# Patient Record
Sex: MALE | Race: WHITE | NOT HISPANIC OR LATINO | Employment: FULL TIME | URBAN - METROPOLITAN AREA
[De-identification: names, ages, dates, MRNs, and addresses within clinical notes are randomized per-mention and may not be internally consistent; named-entity substitution may affect disease eponyms.]

---

## 2020-05-15 ENCOUNTER — OFFICE VISIT - HEALTHEAST (OUTPATIENT)
Dept: FAMILY MEDICINE | Facility: CLINIC | Age: 40
End: 2020-05-15

## 2020-05-15 ENCOUNTER — COMMUNICATION - HEALTHEAST (OUTPATIENT)
Dept: TELEHEALTH | Facility: CLINIC | Age: 40
End: 2020-05-15

## 2020-05-15 DIAGNOSIS — R00.0 TACHYCARDIA: ICD-10-CM

## 2020-05-15 DIAGNOSIS — R03.0 ELEVATED BLOOD PRESSURE READING WITHOUT DIAGNOSIS OF HYPERTENSION: ICD-10-CM

## 2020-05-15 DIAGNOSIS — H90.2 CONDUCTIVE HEARING LOSS, UNILATERAL: ICD-10-CM

## 2020-05-15 DIAGNOSIS — F17.200 TOBACCO USE DISORDER: ICD-10-CM

## 2020-05-15 LAB
ALBUMIN SERPL-MCNC: 4.7 G/DL (ref 3.5–5)
ALP SERPL-CCNC: 56 U/L (ref 45–120)
ALT SERPL W P-5'-P-CCNC: 241 U/L (ref 0–45)
ANION GAP SERPL CALCULATED.3IONS-SCNC: 15 MMOL/L (ref 5–18)
AST SERPL W P-5'-P-CCNC: 155 U/L (ref 0–40)
BILIRUB SERPL-MCNC: 0.7 MG/DL (ref 0–1)
BUN SERPL-MCNC: 5 MG/DL (ref 8–22)
CALCIUM SERPL-MCNC: 9.5 MG/DL (ref 8.5–10.5)
CHLORIDE BLD-SCNC: 101 MMOL/L (ref 98–107)
CO2 SERPL-SCNC: 22 MMOL/L (ref 22–31)
CREAT SERPL-MCNC: 0.73 MG/DL (ref 0.7–1.3)
ERYTHROCYTE [DISTWIDTH] IN BLOOD BY AUTOMATED COUNT: 12.6 % (ref 11–14.5)
GFR SERPL CREATININE-BSD FRML MDRD: >60 ML/MIN/1.73M2
GLUCOSE BLD-MCNC: 98 MG/DL (ref 70–125)
HCT VFR BLD AUTO: 46.1 % (ref 40–54)
HGB BLD-MCNC: 15.7 G/DL (ref 14–18)
MCH RBC QN AUTO: 32.2 PG (ref 27–34)
MCHC RBC AUTO-ENTMCNC: 34 G/DL (ref 32–36)
MCV RBC AUTO: 95 FL (ref 80–100)
PLATELET # BLD AUTO: 157 THOU/UL (ref 140–440)
PMV BLD AUTO: 7.7 FL (ref 7–10)
POTASSIUM BLD-SCNC: 3.9 MMOL/L (ref 3.5–5)
PROT SERPL-MCNC: 7.8 G/DL (ref 6–8)
RBC # BLD AUTO: 4.88 MILL/UL (ref 4.4–6.2)
SODIUM SERPL-SCNC: 138 MMOL/L (ref 136–145)
TSH SERPL DL<=0.005 MIU/L-ACNC: 0.96 UIU/ML (ref 0.3–5)
WBC: 4.6 THOU/UL (ref 4–11)

## 2020-05-18 ENCOUNTER — COMMUNICATION - HEALTHEAST (OUTPATIENT)
Dept: FAMILY MEDICINE | Facility: CLINIC | Age: 40
End: 2020-05-18

## 2020-06-09 ENCOUNTER — OFFICE VISIT - HEALTHEAST (OUTPATIENT)
Dept: FAMILY MEDICINE | Facility: CLINIC | Age: 40
End: 2020-06-09

## 2020-06-09 DIAGNOSIS — I10 ESSENTIAL HYPERTENSION, BENIGN: ICD-10-CM

## 2020-06-09 DIAGNOSIS — F10.10 ALCOHOL ABUSE: ICD-10-CM

## 2020-06-09 DIAGNOSIS — F17.200 TOBACCO USE DISORDER: ICD-10-CM

## 2020-06-09 DIAGNOSIS — R00.0 TACHYCARDIA: ICD-10-CM

## 2020-06-09 DIAGNOSIS — R79.89 ABNORMAL LFTS: ICD-10-CM

## 2020-06-09 LAB
ALBUMIN SERPL-MCNC: 4.8 G/DL (ref 3.5–5)
ALP SERPL-CCNC: 58 U/L (ref 45–120)
ALT SERPL W P-5'-P-CCNC: 316 U/L (ref 0–45)
ANION GAP SERPL CALCULATED.3IONS-SCNC: 12 MMOL/L (ref 5–18)
AST SERPL W P-5'-P-CCNC: 137 U/L (ref 0–40)
BILIRUB SERPL-MCNC: 1.6 MG/DL (ref 0–1)
BUN SERPL-MCNC: 8 MG/DL (ref 8–22)
CALCIUM SERPL-MCNC: 10.5 MG/DL (ref 8.5–10.5)
CHLORIDE BLD-SCNC: 98 MMOL/L (ref 98–107)
CHOLEST SERPL-MCNC: 292 MG/DL
CO2 SERPL-SCNC: 27 MMOL/L (ref 22–31)
CREAT SERPL-MCNC: 0.77 MG/DL (ref 0.7–1.3)
FASTING STATUS PATIENT QL REPORTED: YES
GFR SERPL CREATININE-BSD FRML MDRD: >60 ML/MIN/1.73M2
GGT SERPL-CCNC: 215 U/L (ref 0–50)
GLUCOSE BLD-MCNC: 91 MG/DL (ref 70–125)
HDLC SERPL-MCNC: 97 MG/DL
LDLC SERPL CALC-MCNC: 179 MG/DL
POTASSIUM BLD-SCNC: 4.6 MMOL/L (ref 3.5–5)
PROT SERPL-MCNC: 7.8 G/DL (ref 6–8)
SODIUM SERPL-SCNC: 137 MMOL/L (ref 136–145)
TRIGL SERPL-MCNC: 78 MG/DL

## 2020-06-10 ENCOUNTER — COMMUNICATION - HEALTHEAST (OUTPATIENT)
Dept: FAMILY MEDICINE | Facility: CLINIC | Age: 40
End: 2020-06-10

## 2020-06-10 LAB
HAV IGM SERPL QL IA: NEGATIVE
HBV CORE IGM SERPL QL IA: NEGATIVE
HBV SURFACE AG SERPL QL IA: NEGATIVE
HCV AB SERPL QL IA: NEGATIVE

## 2020-07-10 ENCOUNTER — OFFICE VISIT - HEALTHEAST (OUTPATIENT)
Dept: FAMILY MEDICINE | Facility: CLINIC | Age: 40
End: 2020-07-10

## 2020-07-10 DIAGNOSIS — R00.0 TACHYCARDIA: ICD-10-CM

## 2020-07-10 DIAGNOSIS — R79.89 HIGH SERUM FERRITIN: ICD-10-CM

## 2020-07-10 DIAGNOSIS — I10 ESSENTIAL HYPERTENSION, BENIGN: ICD-10-CM

## 2020-07-10 DIAGNOSIS — F10.10 ALCOHOL ABUSE: ICD-10-CM

## 2020-07-10 DIAGNOSIS — R79.89 ABNORMAL LFTS: ICD-10-CM

## 2020-07-10 DIAGNOSIS — F17.200 TOBACCO USE DISORDER: ICD-10-CM

## 2020-07-10 LAB
ALBUMIN SERPL-MCNC: 4.8 G/DL (ref 3.5–5)
ALP SERPL-CCNC: 52 U/L (ref 45–120)
ALT SERPL W P-5'-P-CCNC: 405 U/L (ref 0–45)
ANION GAP SERPL CALCULATED.3IONS-SCNC: 13 MMOL/L (ref 5–18)
AST SERPL W P-5'-P-CCNC: 250 U/L (ref 0–40)
BILIRUB SERPL-MCNC: 0.9 MG/DL (ref 0–1)
BUN SERPL-MCNC: 7 MG/DL (ref 8–22)
CALCIUM SERPL-MCNC: 10.4 MG/DL (ref 8.5–10.5)
CHLORIDE BLD-SCNC: 97 MMOL/L (ref 98–107)
CO2 SERPL-SCNC: 26 MMOL/L (ref 22–31)
CREAT SERPL-MCNC: 0.78 MG/DL (ref 0.7–1.3)
FERRITIN SERPL-MCNC: 875 NG/ML (ref 27–300)
GFR SERPL CREATININE-BSD FRML MDRD: >60 ML/MIN/1.73M2
GLUCOSE BLD-MCNC: 86 MG/DL (ref 70–125)
POTASSIUM BLD-SCNC: 4.4 MMOL/L (ref 3.5–5)
PROT SERPL-MCNC: 7.8 G/DL (ref 6–8)
SODIUM SERPL-SCNC: 136 MMOL/L (ref 136–145)
TSH SERPL DL<=0.005 MIU/L-ACNC: 1.05 UIU/ML (ref 0.3–5)

## 2020-07-12 ENCOUNTER — AMBULATORY - HEALTHEAST (OUTPATIENT)
Dept: FAMILY MEDICINE | Facility: CLINIC | Age: 40
End: 2020-07-12

## 2020-07-12 DIAGNOSIS — R79.89 ABNORMAL LFTS: ICD-10-CM

## 2020-07-12 DIAGNOSIS — R79.89 HIGH SERUM FERRITIN: ICD-10-CM

## 2020-07-13 LAB
IRON SATN MFR SERPL: 40 % (ref 20–50)
IRON SERPL-MCNC: 120 UG/DL (ref 42–175)
TIBC SERPL-MCNC: 301 UG/DL (ref 313–563)
TRANSFERRIN SERPL-MCNC: 241 MG/DL (ref 212–360)

## 2020-08-03 ENCOUNTER — COMMUNICATION - HEALTHEAST (OUTPATIENT)
Dept: FAMILY MEDICINE | Facility: CLINIC | Age: 40
End: 2020-08-03

## 2020-08-03 DIAGNOSIS — I10 ESSENTIAL HYPERTENSION, BENIGN: ICD-10-CM

## 2021-01-21 ENCOUNTER — OFFICE VISIT - HEALTHEAST (OUTPATIENT)
Dept: FAMILY MEDICINE | Facility: CLINIC | Age: 41
End: 2021-01-21

## 2021-01-21 DIAGNOSIS — R79.89 HIGH SERUM FERRITIN: ICD-10-CM

## 2021-01-21 DIAGNOSIS — R79.89 ABNORMAL LFTS: ICD-10-CM

## 2021-01-21 DIAGNOSIS — I10 ESSENTIAL HYPERTENSION, BENIGN: ICD-10-CM

## 2021-01-21 DIAGNOSIS — Z00.00 ROUTINE GENERAL MEDICAL EXAMINATION AT A HEALTH CARE FACILITY: ICD-10-CM

## 2021-01-21 DIAGNOSIS — F10.10 ALCOHOL ABUSE: ICD-10-CM

## 2021-01-21 LAB
ALBUMIN SERPL-MCNC: 4.9 G/DL (ref 3.5–5)
ALP SERPL-CCNC: 47 U/L (ref 45–120)
ALT SERPL W P-5'-P-CCNC: 197 U/L (ref 0–45)
ANION GAP SERPL CALCULATED.3IONS-SCNC: 13 MMOL/L (ref 5–18)
AST SERPL W P-5'-P-CCNC: 186 U/L (ref 0–40)
BILIRUB SERPL-MCNC: 0.8 MG/DL (ref 0–1)
BUN SERPL-MCNC: 8 MG/DL (ref 8–22)
CALCIUM SERPL-MCNC: 10.3 MG/DL (ref 8.5–10.5)
CHLORIDE BLD-SCNC: 98 MMOL/L (ref 98–107)
CO2 SERPL-SCNC: 27 MMOL/L (ref 22–31)
CREAT SERPL-MCNC: 0.78 MG/DL (ref 0.7–1.3)
ERYTHROCYTE [DISTWIDTH] IN BLOOD BY AUTOMATED COUNT: 12.3 % (ref 11–14.5)
FERRITIN SERPL-MCNC: 896 NG/ML (ref 27–300)
GFR SERPL CREATININE-BSD FRML MDRD: >60 ML/MIN/1.73M2
GGT SERPL-CCNC: 302 U/L (ref 0–50)
GLUCOSE BLD-MCNC: 92 MG/DL (ref 70–125)
HCT VFR BLD AUTO: 42.2 % (ref 40–54)
HGB BLD-MCNC: 14.5 G/DL (ref 14–18)
HIV 1+2 AB+HIV1 P24 AG SERPL QL IA: NEGATIVE
MCH RBC QN AUTO: 33.2 PG (ref 27–34)
MCHC RBC AUTO-ENTMCNC: 34.3 G/DL (ref 32–36)
MCV RBC AUTO: 97 FL (ref 80–100)
PLATELET # BLD AUTO: 163 THOU/UL (ref 140–440)
PMV BLD AUTO: 6.9 FL (ref 7–10)
POTASSIUM BLD-SCNC: 4.3 MMOL/L (ref 3.5–5)
PROT SERPL-MCNC: 8.1 G/DL (ref 6–8)
RBC # BLD AUTO: 4.36 MILL/UL (ref 4.4–6.2)
SODIUM SERPL-SCNC: 138 MMOL/L (ref 136–145)
WBC: 2.6 THOU/UL (ref 4–11)

## 2021-01-21 RX ORDER — LOSARTAN POTASSIUM AND HYDROCHLOROTHIAZIDE 25; 100 MG/1; MG/1
1 TABLET ORAL DAILY
Qty: 90 TABLET | Refills: 1 | Status: SHIPPED | OUTPATIENT
Start: 2021-01-21 | End: 2021-07-24

## 2021-01-21 ASSESSMENT — MIFFLIN-ST. JEOR: SCORE: 1700.3

## 2021-02-22 ENCOUNTER — RECORDS - HEALTHEAST (OUTPATIENT)
Dept: ADMINISTRATIVE | Facility: OTHER | Age: 41
End: 2021-02-22

## 2021-02-22 LAB
ALT SERPL W/O P-5'-P-CCNC: 243 U/L (ref 0–78)
AST SERPL-CCNC: 152 U/L (ref 0–37)

## 2021-02-23 ENCOUNTER — RECORDS - HEALTHEAST (OUTPATIENT)
Dept: ADMINISTRATIVE | Facility: OTHER | Age: 41
End: 2021-02-23

## 2021-02-23 ENCOUNTER — RECORDS - HEALTHEAST (OUTPATIENT)
Dept: SCHEDULING | Facility: CLINIC | Age: 41
End: 2021-02-23

## 2021-02-23 DIAGNOSIS — I10 HYPERTENSION, UNSPECIFIED TYPE: ICD-10-CM

## 2021-02-23 DIAGNOSIS — F10.10 ALCOHOL ABUSE: ICD-10-CM

## 2021-02-23 DIAGNOSIS — R79.89 ELEVATED FERRITIN: ICD-10-CM

## 2021-02-23 DIAGNOSIS — R74.8 ELEVATED LIVER ENZYMES: ICD-10-CM

## 2021-02-24 ENCOUNTER — RECORDS - HEALTHEAST (OUTPATIENT)
Dept: ADMINISTRATIVE | Facility: OTHER | Age: 41
End: 2021-02-24

## 2021-03-03 ENCOUNTER — OFFICE VISIT - HEALTHEAST (OUTPATIENT)
Dept: FAMILY MEDICINE | Facility: CLINIC | Age: 41
End: 2021-03-03

## 2021-03-03 ENCOUNTER — RECORDS - HEALTHEAST (OUTPATIENT)
Dept: HEALTH INFORMATION MANAGEMENT | Facility: CLINIC | Age: 41
End: 2021-03-03

## 2021-03-03 DIAGNOSIS — R00.0 TACHYCARDIA: ICD-10-CM

## 2021-03-03 DIAGNOSIS — R79.89 ABNORMAL LFTS: ICD-10-CM

## 2021-03-03 DIAGNOSIS — F17.200 TOBACCO USE DISORDER: ICD-10-CM

## 2021-03-03 DIAGNOSIS — F10.10 ALCOHOL ABUSE: ICD-10-CM

## 2021-03-03 DIAGNOSIS — I10 ESSENTIAL HYPERTENSION, BENIGN: ICD-10-CM

## 2021-05-07 ENCOUNTER — COMMUNICATION - HEALTHEAST (OUTPATIENT)
Dept: SCHEDULING | Facility: CLINIC | Age: 41
End: 2021-05-07

## 2021-05-10 ENCOUNTER — OFFICE VISIT - HEALTHEAST (OUTPATIENT)
Dept: FAMILY MEDICINE | Facility: CLINIC | Age: 41
End: 2021-05-10

## 2021-05-10 DIAGNOSIS — F10.10 ALCOHOL ABUSE: ICD-10-CM

## 2021-05-10 DIAGNOSIS — F10.931 ALCOHOL WITHDRAWAL SYNDROME, WITH DELIRIUM (H): ICD-10-CM

## 2021-05-10 DIAGNOSIS — F10.931 DELIRIUM TREMENS (H): ICD-10-CM

## 2021-05-10 DIAGNOSIS — R79.89 ABNORMAL LFTS: ICD-10-CM

## 2021-05-10 RX ORDER — NALTREXONE HYDROCHLORIDE 50 MG/1
50 TABLET, FILM COATED ORAL DAILY
Qty: 30 TABLET | Refills: 2 | Status: SHIPPED | OUTPATIENT
Start: 2021-05-10 | End: 2021-09-10

## 2021-05-10 ASSESSMENT — ANXIETY QUESTIONNAIRES
7. FEELING AFRAID AS IF SOMETHING AWFUL MIGHT HAPPEN: NOT AT ALL
IF YOU CHECKED OFF ANY PROBLEMS ON THIS QUESTIONNAIRE, HOW DIFFICULT HAVE THESE PROBLEMS MADE IT FOR YOU TO DO YOUR WORK, TAKE CARE OF THINGS AT HOME, OR GET ALONG WITH OTHER PEOPLE: NOT DIFFICULT AT ALL
4. TROUBLE RELAXING: NOT AT ALL
3. WORRYING TOO MUCH ABOUT DIFFERENT THINGS: NOT AT ALL
6. BECOMING EASILY ANNOYED OR IRRITABLE: SEVERAL DAYS
5. BEING SO RESTLESS THAT IT IS HARD TO SIT STILL: NOT AT ALL
1. FEELING NERVOUS, ANXIOUS, OR ON EDGE: NOT AT ALL
2. NOT BEING ABLE TO STOP OR CONTROL WORRYING: NOT AT ALL
GAD7 TOTAL SCORE: 1

## 2021-05-10 ASSESSMENT — PATIENT HEALTH QUESTIONNAIRE - PHQ9: SUM OF ALL RESPONSES TO PHQ QUESTIONS 1-9: 0

## 2021-05-27 VITALS
HEART RATE: 97 BPM | DIASTOLIC BLOOD PRESSURE: 78 MMHG | WEIGHT: 173.7 LBS | SYSTOLIC BLOOD PRESSURE: 122 MMHG | OXYGEN SATURATION: 98 %

## 2021-05-27 ASSESSMENT — PATIENT HEALTH QUESTIONNAIRE - PHQ9: SUM OF ALL RESPONSES TO PHQ QUESTIONS 1-9: 0

## 2021-05-28 ASSESSMENT — ANXIETY QUESTIONNAIRES: GAD7 TOTAL SCORE: 1

## 2021-06-04 ENCOUNTER — OFFICE VISIT - HEALTHEAST (OUTPATIENT)
Dept: FAMILY MEDICINE | Facility: CLINIC | Age: 41
End: 2021-06-04

## 2021-06-04 VITALS
OXYGEN SATURATION: 97 % | SYSTOLIC BLOOD PRESSURE: 158 MMHG | WEIGHT: 172 LBS | HEART RATE: 107 BPM | DIASTOLIC BLOOD PRESSURE: 104 MMHG

## 2021-06-04 VITALS
HEART RATE: 94 BPM | SYSTOLIC BLOOD PRESSURE: 150 MMHG | OXYGEN SATURATION: 98 % | DIASTOLIC BLOOD PRESSURE: 90 MMHG | WEIGHT: 168 LBS

## 2021-06-04 VITALS
WEIGHT: 166 LBS | DIASTOLIC BLOOD PRESSURE: 78 MMHG | OXYGEN SATURATION: 98 % | HEART RATE: 118 BPM | SYSTOLIC BLOOD PRESSURE: 120 MMHG

## 2021-06-04 DIAGNOSIS — R79.89 ABNORMAL LFTS: ICD-10-CM

## 2021-06-04 DIAGNOSIS — F10.10 ALCOHOL ABUSE: ICD-10-CM

## 2021-06-04 DIAGNOSIS — I10 ESSENTIAL HYPERTENSION, BENIGN: ICD-10-CM

## 2021-06-04 DIAGNOSIS — R00.0 TACHYCARDIA: ICD-10-CM

## 2021-06-04 RX ORDER — METOPROLOL SUCCINATE 100 MG/1
100 TABLET, EXTENDED RELEASE ORAL DAILY
Qty: 90 TABLET | Refills: 3 | Status: SHIPPED | OUTPATIENT
Start: 2021-06-04 | End: 2022-05-06

## 2021-06-05 VITALS
TEMPERATURE: 98.9 F | BODY MASS INDEX: 23.8 KG/M2 | SYSTOLIC BLOOD PRESSURE: 140 MMHG | HEART RATE: 114 BPM | OXYGEN SATURATION: 96 % | HEIGHT: 71 IN | DIASTOLIC BLOOD PRESSURE: 80 MMHG | WEIGHT: 170 LBS

## 2021-06-05 VITALS
OXYGEN SATURATION: 98 % | WEIGHT: 177 LBS | HEART RATE: 123 BPM | SYSTOLIC BLOOD PRESSURE: 134 MMHG | TEMPERATURE: 98.4 F | BODY MASS INDEX: 25.04 KG/M2 | DIASTOLIC BLOOD PRESSURE: 78 MMHG

## 2021-06-08 NOTE — PROGRESS NOTES
Assessment/Plan:    1. Essential hypertension, benign  Hypertension improved control with prior blood pressures 170/110, 158/108, and 158/104 on May 15, 2020.  Started on losartan 50 mg daily.  No side effects.  Blood pressure 146/88 on recheck today.  Switch to losartan hydrochlorothiazide 50/12.5 and use 1 tablet daily.  Blood pressure recheck in 4 weeks.  Check lipid cascade today while fasting.  - losartan-hydrochlorothiazide (HYZAAR) 50-12.5 mg per tablet; Take 1 tablet by mouth daily.  Dispense: 30 tablet; Refill: 2  - Lipid Cascade    2. Tachycardia  Prior tachycardia with pulse 107 now improved and no longer tachycardic.  TSH was normal May 15, 2020.    3. Tobacco use disorder  Continues half pack per day smoking habit.  Chantix was prescribed however patient not ready to pick it up at the pharmacy however wants it available in case he decides to initiate smoking cessation effort through Chantix.  - varenicline (CHANTIX RASHMI) 0.5 mg (11)- 1 mg (42) tablet; Take  0.5mg once daily for 3 days, then 0.5mg twice daily for 3 days, then 1mg twice daily.  Dispense: 60 tablet; Refill: 2    4. Abnormal LFTs  Recent LFTs elevated May 15, 2020 with  and .  Drinks 4-5 beers per day plus more on weekends.  Will repeat LFTs as well as acute hepatitis screen and GGT level.  Consume no more than 2 ounces alcohol daily.  Recheck at follow-up in 4 weeks.  Consider hepatic ultrasound.  - Comprehensive Metabolic Panel  - GGT (Gamma GT)  - Hepatitis Acute Evaluation    5. Alcohol abuse  As above, restrict alcohol to no more than 2 ounces daily due to current consumption of 4-5 beers per day plus more on weekends.        Subjective:    Agus Barragan is seen today for follow-up evaluation.  Was seen May 15, 2020 with concerns for cerumen impaction.  No further concerns with this.  He was noted to have elevated blood pressure.  Started on losartan 50 mg daily due to blood pressures of 170/110 with recheck blood  pressure 158/104.  No side effects from medication.  Not checking blood pressures outside of clinic.  Continues to smoke half pack per day.  Lab evaluation including CBC, TSH and comprehensive metabolic panel were normal except for LFT elevation with  and .  Admits to drinking 4-5 beers per day and more on weekends.  Does not feel that he is an alcoholic.  Comes from a family which this is typical behavior and high functioning.  Wife's family also with similar behavior.  Comprehensive review of systems as above otherwise all negative.     - Areli  1 daughter - Kathy (7)  Tobacco: 1/2 ppd (prior 1 ppd) - prior vape  EtOH: 5-6 beer/drinks on weekend  Mom - mom's side with HTN  Dad -   1 younger bro - cleft palate  Surgeries: none  Hospitalizations: none  Work: [employer] ( for disability benefits)  Hobbies:    No past surgical history on file.     No family history on file.     No past medical history on file.     Social History     Tobacco Use     Smoking status: Current Every Day Smoker     Smokeless tobacco: Never Used   Substance Use Topics     Alcohol use: Not on file     Drug use: Not on file        Current Outpatient Medications   Medication Sig Dispense Refill     losartan (COZAAR) 50 MG tablet Take 1 tablet (50 mg total) by mouth daily. 30 tablet 2     losartan-hydrochlorothiazide (HYZAAR) 50-12.5 mg per tablet Take 1 tablet by mouth daily. 30 tablet 2     varenicline (CHANTIX RASHMI) 0.5 mg (11)- 1 mg (42) tablet Take  0.5mg once daily for 3 days, then 0.5mg twice daily for 3 days, then 1mg twice daily. 60 tablet 2     No current facility-administered medications for this visit.           Objective:    Vitals:    06/09/20 0925   BP: 150/90   Pulse: 94   SpO2: 98%   Weight: 168 lb (76.2 kg)      There is no height or weight on file to calculate BMI.    Alert.  No apparent distress blood pressure 146/88 on recheck.  Chest clear.  Cardiac exam regular.  Pulse without  tachycardia.  Extremities warm and dry.      This note has been dictated using voice recognition software and as a result may contain minor grammatical errors and unintended word substitutions.

## 2021-06-08 NOTE — PROGRESS NOTES
Assessment/Plan:    1. Conductive hearing loss, unilateral  Right-sided hearing loss associated with cerumen impaction.  Ear lavage performed.  Tolerated well.  Resolution of hearing loss.    2. Elevated blood pressure reading without diagnosis of hypertension  Elevated blood pressure new diagnosis hypertension with blood pressure 170/110 rechecked 158/108 and then 158/104.  And initiate losartan 50 mg daily.  Check TSH CBC and comprehensive metabolic panels.  Blood pressure recheck at follow-up in 4 weeks.  - losartan (COZAAR) 50 MG tablet; Take 1 tablet (50 mg total) by mouth daily.  Dispense: 30 tablet; Refill: 2  - Thyroid Stimulating Hormone (TSH)  - Comprehensive Metabolic Panel  - HM2(CBC w/o Differential)    3. Tachycardia  Noted tachycardia pulse 107.  Reassess at follow-up.  Consider beta-blocker if persistent issue.  Ensure no evidence for hyperthyroidism.  - Thyroid Stimulating Hormone (TSH)    4. Tobacco use disorder  Tobacco use disorder.  Currently half pack per day.  Continues attempts at discontinuing.  Declines intervention.        Subjective:    Agus Barragan is seen today for hearing loss.  Right ear.  Uses Q-tips.  Has had blood pressure elevation in the past.  Last seen 7 years ago.  Every time he is at the dentist etc. blood pressure is high and has been told that he needs to be seen.  Past medical social and family history reviewed as noted below.  Denies recent illness.  No headache.  No vision change.  No nasal congestion.  No sore throat.  No cough or shortness of breath.  To smoke currently half pack per day.  Vaping previously and thinks of transitioning to vaping in order to quit.  Patient's mother and maternal family history of hypertension.  Comprehensive review of systems as above otherwise all negative.     - Areli  1 daughter - Kathy  Tobacco: 1/2 ppd (prior 1 ppd) - prior vape  EtOH: 5-6 beer/drinks on weekend  Mom - mom's side with HTN  Dad -   1 younger bro - cleft  palate  Surgeries: none  Hospitalizations: none  Work: [employer] ( for disability benefits)  Hobbies:    History reviewed. No pertinent surgical history.     History reviewed. No pertinent family history.     History reviewed. No pertinent past medical history.     Social History     Tobacco Use     Smoking status: Current Every Day Smoker     Smokeless tobacco: Never Used   Substance Use Topics     Alcohol use: Not on file     Drug use: Not on file        Current Outpatient Medications   Medication Sig Dispense Refill     losartan (COZAAR) 50 MG tablet Take 1 tablet (50 mg total) by mouth daily. 30 tablet 2     No current facility-administered medications for this visit.           Objective:    Vitals:    05/15/20 1516 05/15/20 1523 05/15/20 1647 05/15/20 1648   BP: (!) 170/110 (!) 160/100 (!) 158/108 (!) 158/104   Pulse: (!) 107      SpO2: 97%      Weight: 172 lb (78 kg)         There is no height or weight on file to calculate BMI.    Blood pressure on recheck 158/108 then 158/104.  Chest clear.  Cardiac exam regular.  Ear lavage performed personally with excellent results.  Significant cerumen removal noted with normal-appearing tympanic membrane without perforation.  No middle ear effusion.  Left TM and external canals normal.  Oropharynx normal.  Extremities warm and dry.  Tachycardia noted.      This note has been dictated using voice recognition software and as a result may contain minor grammatical errors and unintended word substitutions.

## 2021-06-10 NOTE — TELEPHONE ENCOUNTER
Medication Request  Medication name:   Losartan 50 mg    Hydrochlorothiazide 12.5 mg    Requested Pharmacy: [address], [phone]  Reason for request:   Patient is requesting scripts to go to a mail order pharmacy.  Please send 90 day supply with 3 refills.  Thank you  When did you use medication last?:    Currently taking.  Patient offered appointment:  No  Okay to leave a detailed message: yes

## 2021-06-14 NOTE — PROGRESS NOTES
Assessment/Plan:     1. Routine general medical examination at a health care facility  Routine healthcare maintenance.  Preventative cares reviewed.  Will need Adacel booster at follow-up.  Routine HIV screen, low risk.    2. Essential hypertension, benign  Hypertension suboptimal control.  Blood pressure 146/84 on recheck.  Increase losartan hydrochlorothiazide 50/12.5 up to 100/25 daily and recheck in the office in 4 weeks.  Contributing factors of excess alcohol consumption reviewed.  Recommendation to abstain however patient appears hesitant.  - Comprehensive Metabolic Panel  - losartan-hydrochlorothiazide (HYZAAR) 100-25 mg per tablet; Take 1 tablet by mouth daily.  Dispense: 90 tablet; Refill: 1    3. Abnormal LFTs  LFT elevation historically with prior GGT level 215,  and .  Ferritin level was elevated at 875 with no personal or family history of hemochromatosis noted.  We will repeat ferritin level and add hemochromatosis gene testing if abnormal.  Patient encouraged to see [facility] in the next 1 to 2 weeks as previously referred.  - GGT (Gamma GT)  - Comprehensive Metabolic Panel    4. High serum ferritin  Prior high ferritin level.  Repeat ferritin level as noted with consideration for hemochromatosis gene testing if continued significant elevation.  - Ferritin  - HM2(CBC w/o Differential)    5. Alcohol abuse  4-5 drinks per day ongoing.  Has not cut back.  Appears hesitant to discontinue.  Increase life stressors described.  Morning tremor typically worse with improvement throughout the day.             Subjective:      Agus Barragan is a 40 y.o. male who presents for an annual exam.  Nonfasting.  Hard-boiled eggs this morning.  Prior cluster elevation noted however protective HDL at 97 with a total cholesterol to HDL ratio around 3.  Patient has had hypertension and continues losartan hydrochlorothiazide 50/12.5 daily.  States that he quit smoking cigarettes in August however now  vaping likely getting more nicotine than before.  Drinks 4-5 drinks per day.  This is not been reduced.  Prior LFT elevation including GGT of 215,  and .  Prior referral to see gastroenterology however never scheduled.  Did get a call within the last 2 weeks and will arrange at earliest convenience.  Does have a tremor often worse in the morning better throughout the day.  Increase life stressors associated with a job change which has led to more stress, not less.  Denies recent illness.  Comprehensive review of systems as above otherwise all negative.       - Areli  1 daughter - Kathy (8)  Tobacco: 1/2 ppd (prior 1 ppd) - prior vape  EtOH: 5-6 beer/drinks daily  Mom - mom's side with HTN  Dad -   1 younger bro - cleft palate  Surgeries: none  Hospitalizations: none  Work: [employer] ( for disability benefits)  Hobbies:      Healthy Habits:   Regular Exercise: Yes  Healthy Diet: Yes  Dental Visits Regularly: Yes  Seat Belt: Yes   Sexually active: Yes  Colonoscopy: N/A  Lipid Profile: Yes  Glucose Screen: Yes      There is no immunization history on file for this patient.  Immunization status: will need Tdap, etc.  Vision Screening:both eyes  Hearing: PASS     Current Outpatient Medications   Medication Sig Dispense Refill     varenicline (CHANTIX RASHMI) 0.5 mg (11)- 1 mg (42) tablet Take  0.5mg once daily for 3 days, then 0.5mg twice daily for 3 days, then 1mg twice daily. 60 tablet 2     losartan-hydrochlorothiazide (HYZAAR) 100-25 mg per tablet Take 1 tablet by mouth daily. 90 tablet 1     No current facility-administered medications for this visit.      History reviewed. No pertinent past medical history.  History reviewed. No pertinent surgical history.  Patient has no known allergies.  History reviewed. No pertinent family history.  Social History     Socioeconomic History     Marital status:      Spouse name: Not on file     Number of children: Not on file     Years  "of education: Not on file     Highest education level: Not on file   Occupational History     Not on file   Social Needs     Financial resource strain: Not on file     Food insecurity     Worry: Not on file     Inability: Not on file     Transportation needs     Medical: Not on file     Non-medical: Not on file   Tobacco Use     Smoking status: Current Every Day Smoker     Smokeless tobacco: Never Used   Substance and Sexual Activity     Alcohol use: Not on file     Drug use: Not on file     Sexual activity: Not on file   Lifestyle     Physical activity     Days per week: Not on file     Minutes per session: Not on file     Stress: Not on file   Relationships     Social connections     Talks on phone: Not on file     Gets together: Not on file     Attends Yarsani service: Not on file     Active member of club or organization: Not on file     Attends meetings of clubs or organizations: Not on file     Relationship status: Not on file     Intimate partner violence     Fear of current or ex partner: Not on file     Emotionally abused: Not on file     Physically abused: Not on file     Forced sexual activity: Not on file   Other Topics Concern     Not on file   Social History Narrative     Not on file       Review of Systems  Comprehensive ROS: as above, otherwise all negative.           Objective:     /80   Pulse (!) 114   Temp 98.9  F (37.2  C)   Ht 5' 10.5\" (1.791 m)   Wt 170 lb (77.1 kg)   SpO2 96%   BMI 24.05 kg/m    Body mass index is 24.05 kg/m .    Physical    General Appearance:    Alert, cooperative, no distress, appears stated age.  Mildly tremulous intermittently.   Head:    Normocephalic, without obvious abnormality, atraumatic   Eyes:    PERRL, conjunctiva/corneas clear, EOM's intact, fundi     benign, both eyes        Ears:    Normal TM's and external ear canals, both ears   Nose:   Nares normal, septum midline, mucosa normal, no drainage    or sinus tenderness   Throat:   Lips, mucosa, and " tongue normal; teeth and gums normal   Neck:   Supple, symmetrical, trachea midline, no adenopathy;        thyroid:  No enlargement/tenderness/nodules; no carotid    bruit or JVD   Back:     Symmetric, no curvature, ROM normal, no CVA tenderness   Lungs:     Clear to auscultation bilaterally, respirations unlabored   Chest wall:    No tenderness or deformity   Heart:   Tachycardia with pulse rate 114 otherwise normal rhythm, S1 and S2 normal, no murmur, rub   or gallop   Abdomen:     Soft, non-tender, bowel sounds active all four quadrants,     no masses, no organomegaly.     Genitalia:    deferred   Rectal:    deferred   Extremities:   Extremities normal, atraumatic, no cyanosis or edema   Pulses:   2+ and symmetric all extremities   Skin:   Skin color, texture, turgor normal, no rashes or lesions   Lymph nodes:   Cervical, supraclavicular, and axillary nodes normal   Neurologic:   CNII-XII intact. Normal strength, sensation and reflexes       throughout.  Mildly tremulous as noted above.                This note has been dictated using voice recognition software and as a result may contain minor grammatical errors and unintended word substitutions.

## 2021-06-15 NOTE — PROGRESS NOTES
Assessment/Plan:    1. Essential hypertension, benign  Hypertension fair control with improvement noted after increasing losartan hydrochlorothiazide 50/12.5 daily up to 100/25 daily.  States systolic blood pressure continues elevated outside of office with diastolic blood pressure improvement however noted.  We will continue losartan hydrochlorothiazide 100/25 daily and add metoprolol succinate 50 mg daily with blood pressure recheck in office in 3 months anticipated.  - metoprolol succinate (TOPROL XL) 50 MG 24 hr tablet; Take 1 tablet (50 mg total) by mouth daily.  Dispense: 90 tablet; Refill: 3    2. Abnormal LFTs  LFT elevation history.  Has had elevated ferritin and GGT levels with prior  and .  Referred to gastroenterology and did see Dr. Dick and needs hepatic ultrasound as well as follow-up with his office following.    3. Alcohol abuse  Alcohol abuse reviewed.  Cutting back on hard liquor however continues beer.  Does not feel ready to quit.  Does not feel ready for treatment.  Did recommend future treatment with need for complete abstinence reviewed.  Recent INR 0.9.    4. Tobacco use disorder  Vaping currently instead of smoking cigarettes.  Never tried Chantix.  Declines intervention at this time.    5. Tachycardia  Tachycardia present.  Resting tremor this morning.  Likely consistent with alcohol dependence with withdrawal type symptoms.  Pulse 123.  Blood pressure 134/78 on recheck.  Metoprolol succinate 50 mg daily with reassessment in office no later than 3 months.  Prior thyroid test result normal 1.05 on July 10, 2020.  - metoprolol succinate (TOPROL XL) 50 MG 24 hr tablet; Take 1 tablet (50 mg total) by mouth daily.  Dispense: 90 tablet; Refill: 3        Subjective:    Agus Barragan is seen today for follow-up evaluation.  Underlying hypertension.  Prior assessment January 21, 2021 for physical exam.  Had increased losartan hydrochlorothiazide 50/12.5 up to 100/25 daily.   Systolic blood pressure about the same however diastolic blood pressures improved when checking at home.  Patient has had elevated ferritin level and LFTs etc.  Alcohol abuse concerns 4-5 drinks per day described.  Had hemochromatosis genetic testing which was negative for obvious mutation however falls negatives are possible.  Additional testing through Dr. Dick's office with gastroenterology with recommendation for hepatic ultrasound and follow-up with Dr. Dick.  Always seems to have had a fast heart rate most of his life.  Notices worsening tremor in the morning.  Using a vape pen.  Never used Chantix to help quit some nicotine dependence.  Comprehensive review of systems as above otherwise all negative.     - Areli  1 daughter - Kathy (8)  Tobacco: 1/2 ppd (prior 1 ppd) - prior vape  EtOH: 5-6 beer/drinks  Mom - mom's side with HTN  Dad -   1 younger bro - cleft palate  Surgeries: none  Hospitalizations: none  Work: [employer] ( for disability benefits)    History reviewed. No pertinent surgical history.     History reviewed. No pertinent family history.     History reviewed. No pertinent past medical history.     Social History     Tobacco Use     Smoking status: Current Every Day Smoker     Smokeless tobacco: Never Used   Substance Use Topics     Alcohol use: Not on file     Drug use: Not on file        Current Outpatient Medications   Medication Sig Dispense Refill     losartan-hydrochlorothiazide (HYZAAR) 100-25 mg per tablet Take 1 tablet by mouth daily. 90 tablet 1     varenicline (CHANTIX RASHMI) 0.5 mg (11)- 1 mg (42) tablet Take  0.5mg once daily for 3 days, then 0.5mg twice daily for 3 days, then 1mg twice daily. 60 tablet 2     metoprolol succinate (TOPROL XL) 50 MG 24 hr tablet Take 1 tablet (50 mg total) by mouth daily. 90 tablet 3     No current facility-administered medications for this visit.           Objective:    Vitals:    03/03/21 0914 03/03/21 1019   BP: 140/80  134/78   Pulse: (!) 123    Temp: 98.4  F (36.9  C)    SpO2: 98%    Weight: 177 lb (80.3 kg)       Body mass index is 25.04 kg/m .    Alert.  No apparent distress with mild psychomotor agitation.  Resting pulse around 120.  Resting tremor noted.  Blood pressure on recheck 134/78.  Chest clear.      This note has been dictated using voice recognition software and as a result may contain minor grammatical errors and unintended word substitutions.

## 2021-06-16 PROBLEM — F17.200 TOBACCO USE DISORDER: Status: ACTIVE | Noted: 2020-06-09

## 2021-06-16 PROBLEM — R79.89 ABNORMAL LFTS: Status: ACTIVE | Noted: 2020-06-09

## 2021-06-16 PROBLEM — I10 ESSENTIAL HYPERTENSION, BENIGN: Status: ACTIVE | Noted: 2020-06-09

## 2021-06-16 PROBLEM — F10.10 ALCOHOL ABUSE: Status: ACTIVE | Noted: 2020-06-09

## 2021-06-17 NOTE — PROGRESS NOTES
Assessment and Plan   41-year-old male with past medical history of alcohol use disorder and previously elevated LFTs currently undergoing work-up.  Presents with concerns for visual and auditory hallucinations last week.  History most consistent with alcohol use disorder and withdrawal going into delirium tremens.  Possible psychotic disorder but would be very unusual given he has no history of psychosis in the past.  Recommended cessation of all alcohol.  But discussed alcohol withdrawal and severity of this and so stressed the importance of doing this in a controlled fashion.  Recommended inpatient or outpatient treatment facility.  Patient not interested in this and unwilling to pursue this now.  Also discussed potential CBT therapy for addiction.  Patient also refused this.  Finally recommended medications to help with cravings as he identified this is his main trigger for drinking.  We will start naltrexone daily.  Discussed that I would recommend cessation of all alcohol before doing further work-up for other etiologies as I think this is likely the cause.  Patient does not need to go to ER now as he is not withdrawal he started drinking again last Friday.  Did discuss though if he does excessive drinking that he needs to immediately come to clinic or go to the ER for treatment of withdrawal.  Patient will follow up about this with PCP next month at previously planned appointment.    1. Alcohol abuse  - naltrexone (DEPADE) 50 mg tablet; Take 1 tablet (50 mg total) by mouth daily.  Dispense: 30 tablet; Refill: 2    2. Abnormal LFTs  - naltrexone (DEPADE) 50 mg tablet; Take 1 tablet (50 mg total) by mouth daily.  Dispense: 30 tablet; Refill: 2    3. Delirium tremens (H)    4. Alcohol withdrawal syndrome, with delirium (H)    Follow up: Follow-up with PCP next month  Options for treatment and follow-up care were reviewed with the patient and/or guardian. Agus Barragan and/or guardian engaged in the decision  "making process and verbalized understanding of the options discussed and agreed with the final plan.    Dr. Valente Fofana MD         HPI:   Agus Barragan is a 41 y.o.  male with problems as below, relevant PMH of alcohol use disorder, elevated LFT's who presents for:    Chief Complaint   Patient presents with     Mental Health Problem      RB triage note 5/7/21:  Pt is calling in with his wife who reports he is having suicidal thoughts, or what patient describes as hearing voices since Wednesday. Pt reports it all started after being sick with fever, body aches and fatigue. Wife reports no suicide attempt has been made. Pt reports he does not feel depressed, and is not threatening suicide, but his wife is concerned about him, and patient reports he has never heard voices talking to him before.   Pt does not feel like he will harm himself at the moment, he just wants to get away from the voices. Pt reports he does not have a therapist, but would like to speak to a counselor, or mental health care worker.     Today:  He states he is having \"vision and auditory hallucinations\" Wednesday, thursday, Friday. He describes these as\"visiions\" of his daughter and wife walking around house.  He also saw cartoon like images and then heard \"dalton\" type music. Thursday this turned into images of the devil and feeling as though he was being touched by the devil. Never has had something like this before.  Hx of alochol use disorder but no other personal Hx of mental illness. Uncle has Hx of schizophrenia. Last Tuesday did quit drinking due to having some flulike illness.  He was drinking approximately 5 beers and 3-4 shots a day.  He states he did start drinking more when the pandemic started.  He has a long history of alcohol use and daily drinking.  He has gone into withdrawal before.  Do not believe he has been hospitalized for this before though.  He denies feeling suicidal.  Denies depressive type symptoms.         PMHX: "     Patient Active Problem List   Diagnosis     Blood Pressure Isolated Elevated     Essential hypertension, benign     Tobacco use disorder     Abnormal LFTs     Alcohol abuse       Current Outpatient Medications   Medication Sig Dispense Refill     losartan-hydrochlorothiazide (HYZAAR) 100-25 mg per tablet Take 1 tablet by mouth daily. 90 tablet 1     metoprolol succinate (TOPROL XL) 50 MG 24 hr tablet Take 1 tablet (50 mg total) by mouth daily. 90 tablet 3     naltrexone (DEPADE) 50 mg tablet Take 1 tablet (50 mg total) by mouth daily. 30 tablet 2     varenicline (CHANTIX RASHMI) 0.5 mg (11)- 1 mg (42) tablet Take  0.5mg once daily for 3 days, then 0.5mg twice daily for 3 days, then 1mg twice daily. 60 tablet 2     No current facility-administered medications for this visit.        Social History     Tobacco Use     Smoking status: Current Every Day Smoker     Smokeless tobacco: Never Used   Substance Use Topics     Alcohol use: Not on file     Drug use: Not on file       Social History     Social History Narrative     Not on file       No Known Allergies           Review of Systems:    Complete ROS is negative except as noted in the HPI         Physical Exam:   /78 (Patient Site: Left Arm, Patient Position: Sitting, Cuff Size: Adult Large)   Pulse 97   Wt 173 lb 11.2 oz (78.8 kg)   SpO2 98%   BMI 24.57 kg/m      General appearance: Alert, cooperative, no distress, appears stated age  Head: Normocephalic, atraumatic, without obvious abnormality  Eyes: Pupils equal round, reactive.  Conjunctiva clear.  Neck: Supple, symmetric, trachea midline  Lungs: Clear to auscultation bilaterally, no wheezing or crackles present.  Respirations unlabored  Heart: Regular rate and rhythm, normal S1 and S2, no murmur, rub or gallop.  Psych: Normal-appearing hygiene, speech is normal pace and volume, nontangential, noncircumferential, thought process is logical, does not appear to responding to internal stimuli, no  expression of paranoid delusions, mood is normal, no tremor.

## 2021-06-17 NOTE — TELEPHONE ENCOUNTER
Pt is calling in with his wife who reports he is having suicidal thoughts, or what patient describes as hearing voices since Wednesday. Pt reports it all started after being sick with fever, body aches and fatigue. Wife reports no suicide attempt has been made. Pt reports he does not feel depressed, and is not threatening suicide, but his wife is concerned about him, and patient reports he has never heard voices talking to him before.   Pt does not feel like he will harm himself at the moment, he just wants to get away from the voices. Pt reports he does not have a therapist, but would like to speak to a counselor, or mental health care worker.   Pt and wife were given crisis hotline numbers. [facility] hotline, and the National Crisis hotline for Suicide intervention number.   Care advice given, and per protocol patient and wife were advised to call the hotline, and if he develops any thoughts of harming himself, he will need to go to the ER. Wife and patient verbalized understanding, and were advised to call back if they have further questions or concerns.     Tony Felton RN [facility] Triage/Medication Refill    Reason for Disposition    Requesting to talk with a counselor (mental health worker, psychiatrist, etc.)    Additional Information    Negative: Patient attempted suicide    Negative: Patient is threatening suicide now    Negative: Violent behavior, or threatening to physically hurt or kill someone    Negative: Patient is very confused (disoriented, slurred speech) and no other adult (e.g., friend or family member) available    Negative: Difficult to awaken or acting very confused (disoriented, slurred speech) and of new onset    Negative: Sounds like a life-threatening emergency to the triager    Negative: Depression is the main symptom and is not threatening suicide    Negative: Depression symptoms (sadness, hopelessness, decreased energy) and unable to do any normal  activities (e.g., self care, school, work; in comparison to baseline).    Protocols used: SUICIDE [id number]

## 2021-06-20 NOTE — LETTER
Letter by Nakul Morris MD at      Author: Nakul Morris MD Service: -- Author Type: --    Filed:  Encounter Date: 5/18/2020 Status: (Other)         Agus Barragan  [address] [facility] [address]             May 18, 2020         Dear Mr. Barragan,    Below are the results from your recent visit:    Resulted Orders   Thyroid Stimulating Hormone (TSH)   Result Value Ref Range    TSH 0.96 0.30 - 5.00 uIU/mL   Comprehensive Metabolic Panel   Result Value Ref Range    Sodium 138 136 - 145 mmol/L    Potassium 3.9 3.5 - 5.0 mmol/L    Chloride 101 98 - 107 mmol/L    CO2 22 22 - 31 mmol/L    Anion Gap, Calculation 15 5 - 18 mmol/L    Glucose 98 70 - 125 mg/dL    BUN 5 (L) 8 - 22 mg/dL    Creatinine 0.73 0.70 - 1.30 mg/dL    GFR MDRD Af Amer >60 >60 mL/min/1.73m2    GFR MDRD Non Af Amer >60 >60 mL/min/1.73m2    Bilirubin, Total 0.7 0.0 - 1.0 mg/dL    Calcium 9.5 8.5 - 10.5 mg/dL    Protein, Total 7.8 6.0 - 8.0 g/dL    Albumin 4.7 3.5 - 5.0 g/dL    Alkaline Phosphatase 56 45 - 120 U/L     (H) 0 - 40 U/L     (H) 0 - 45 U/L    Narrative    Fasting Glucose reference range is 70-99 mg/dL per  American Diabetes Association (ADA) guidelines.   HM2(CBC w/o Differential)   Result Value Ref Range    WBC 4.6 4.0 - 11.0 thou/uL    RBC 4.88 4.40 - 6.20 mill/uL    Hemoglobin 15.7 14.0 - 18.0 g/dL    Hematocrit 46.1 40.0 - 54.0 %    MCV 95 80 - 100 fL    MCH 32.2 27.0 - 34.0 pg    MCHC 34.0 32.0 - 36.0 g/dL    RDW 12.6 11.0 - 14.5 %    Platelets 157 140 - 440 thou/uL    MPV 7.7 7.0 - 10.0 fL       Your thyroid screening test (i.e. TSH) was normal.     Your kidney function tests were normal.     Your liver function tests were ABNORMAL.  Your enzyme levels (AST and ALT) were significantly elevated.  Further evaluation recommended.  We will complete additional testing at your follow-up blood pressure check.  Please abstain from alcohol until we recheck these tests.    Your complete blood count results were normal.   No evidence for anemia, etc.     Please call with questions or contact us using MyChart.    Sincerely,        Electronically signed by Nakul Morris MD

## 2021-06-20 NOTE — LETTER
Letter by Nakul Morris MD at      Author: Nakul Morris MD Service: -- Author Type: --    Filed:  Encounter Date: 6/10/2020 Status: (Other)         Agus Barragan  [address] [facility] [address]             Myrtle 10, 2020         Dear Mr. Barragan,    Below are the results from your recent visit:    Resulted Orders   Lipid Cascade   Result Value Ref Range    Cholesterol 292 (H) <=199 mg/dL    Triglycerides 78 <=149 mg/dL    HDL Cholesterol 97 >=40 mg/dL    LDL Calculated 179 (H) <=129 mg/dL    Patient Fasting > 8hrs? Yes    Comprehensive Metabolic Panel   Result Value Ref Range    Sodium 137 136 - 145 mmol/L    Potassium 4.6 3.5 - 5.0 mmol/L    Chloride 98 98 - 107 mmol/L    CO2 27 22 - 31 mmol/L    Anion Gap, Calculation 12 5 - 18 mmol/L    Glucose 91 70 - 125 mg/dL    BUN 8 8 - 22 mg/dL    Creatinine 0.77 0.70 - 1.30 mg/dL    GFR MDRD Af Amer >60 >60 mL/min/1.73m2    GFR MDRD Non Af Amer >60 >60 mL/min/1.73m2    Bilirubin, Total 1.6 (H) 0.0 - 1.0 mg/dL    Calcium 10.5 8.5 - 10.5 mg/dL    Protein, Total 7.8 6.0 - 8.0 g/dL    Albumin 4.8 3.5 - 5.0 g/dL    Alkaline Phosphatase 58 45 - 120 U/L     (H) 0 - 40 U/L     (H) 0 - 45 U/L    Narrative    Fasting Glucose reference range is 70-99 mg/dL per  American Diabetes Association (ADA) guidelines.   GGT (Gamma GT)   Result Value Ref Range    GGT (Gamma GT) 215 (H) 0 - 50 U/L   Hepatitis Acute Evaluation   Result Value Ref Range    Hepatitis A Antibody, IgM Negative Negative    Hepatitis B Core Jaida, IgM Negative Negative    Hepatitis B Surface Ag Negative Negative    Hepatitis C Ab Negative Negative       Your cholesterol results were SIGNIFICANTLY elevated.  Ensure regular exercise, healthy diet, and weight loss modifications in order to further improve.  We will plan to recheck your labs while fasting in the next 6-12 months to ensure desired improvement.       Your liver function tests (AST, ALT, and GGT) remain elevated likely due to excess  alcohol.  No evidence for viral hepatitis as etiology.  Recommendation is to abstain from alcohol, then repeat these tests in the next 4-6 weeks to ensure desired improvement.    Please call with questions or contact us using [email or web address].    Sincerely,        Electronically signed by Nakul Morris MD

## 2021-06-26 NOTE — PROGRESS NOTES
Assessment/Plan:    1. Essential hypertension, benign  Hypertension fair control blood pressure 130/88 on recheck.  We will continue use of losartan hydrochlorothiazide 100/25 daily.  Increase metoprolol succinate from 50 mg up to 100 mg daily and reassess at follow-up in 3 months.  - metoprolol succinate (TOPROL XL) 100 MG 24 hr tablet; Take 1 tablet (100 mg total) by mouth daily.  Dispense: 90 tablet; Refill: 3    2. Tachycardia  Pulse initially improved to 100 on recheck after addition of beta-blocker following office visit March 3, 2021.  Did note tachycardia following additional discussion regarding underlying concerns for alcohol abuse with dependence.  Will monitor with dose increase metoprolol succinate from 50 mg up to 100 mg daily.  - metoprolol succinate (TOPROL XL) 100 MG 24 hr tablet; Take 1 tablet (100 mg total) by mouth daily.  Dispense: 90 tablet; Refill: 3    3. Alcohol abuse  Alcohol abuse concerns ongoing with recent delirium tremens episode described visual and auditory hallucinations lasting 2-1/2 days.  Had not felt like drinking due to not feeling well in general.  Admits to approximately 6 but light beer per day in addition to have 5-6 shots of vodka through bloody Fabienne or straight shots.  Patient does agree to initiate naltrexone 50 mg daily while attempting to eliminate hard liquor and restrict beer consumption to no more than 4/day.  Patient declines further intervention at this time and will reassess at follow-up in 3 months.  Discussed health risks associated with decreased life expectancy etc.  Also notes family dynamics for both his as well as his wife's family with significant alcohol consumption as noted below which makes it difficult to anticipate success in quitting completely.    4. Abnormal LFTs  LFT elevation associate with alcohol abuse concerns.  Had been seen through [facility] who had recommended hepatic ultrasound however has not completed due to concern for additional  medical bills etc.  Declines follow-up with [facility] at this time.  Reviewed office note from March 3, 2021 with noted hemochromatosis genetic testing results apparently negative for obvious mutation however possibility of false negatives noted.    40 minutes total time with patient, > 50% with chart review, counseling and coordination of cares, and documentation.         Subjective:    Agus Barragan is seen today for follow-up assessment.  Had been seen March 3, 2021.  Alcohol abuse concern history.  Underlying hypertension with tachycardia as well.  Had increase losartan hydrochlorothiazide 50/12.5 up to 100/25 daily with addition of metoprolol succinate 50 mg daily for tachycardia management etc.  Patient does note 1 episode of visual and auditory hallucinations lasting 2-1/2 days apparently after not drinking for 2 days due to not feeling well in general.  Had been seen May 10, 2021 in office and recommended trial and naltrexone which he has filled however has not initiated.  Admits to about 6 beer per day in addition to 5 or 6 shots of vodka 3 bloody Fabienne or other mixed drink potentially.  His wife also drinks perhaps 6 shots of vodka daily.  Parents have visited from [place] when he was struggling with his withdrawal symptoms however 2 weeks later brought 2 bottles of whiskey over the Memorial Day weekend.  Patient's pulse rate seems to be in the high 90s since starting metoprolol succinate 50 mg daily.  Has declined COVID-19 vaccine series.  Had been seen through [facility] with work-up regarding LFT elevation etc. recommendations for further testing including hepatic ultrasound which patient has declined currently due to cost concerns with medical bills etc.     - Areli   1 daughter - Kathy (8)   Tobacco: 1/2 ppd (prior 1 ppd) - prior vape   EtOH: 5-6 beer/drinks   Mom - mom's side with HTN   Dad -   1 younger bro - cleft palate   Surgeries: none   Hospitalizations:  none   Work: [employer] ( for disability benefits)   Hobbies:    History reviewed. No pertinent surgical history.     History reviewed. No pertinent family history.     History reviewed. No pertinent past medical history.     Social History     Tobacco Use     Smoking status: Current Every Day Smoker     Smokeless tobacco: Never Used   Substance Use Topics     Alcohol use: Not on file     Drug use: Not on file        Current Outpatient Medications   Medication Sig Dispense Refill     losartan-hydrochlorothiazide (HYZAAR) 100-25 mg per tablet Take 1 tablet by mouth daily. 90 tablet 1     metoprolol succinate (TOPROL XL) 100 MG 24 hr tablet Take 1 tablet (100 mg total) by mouth daily. 90 tablet 3     naltrexone (DEPADE) 50 mg tablet Take 1 tablet (50 mg total) by mouth daily. 30 tablet 2     varenicline (CHANTIX RASHMI) 0.5 mg (11)- 1 mg (42) tablet Take  0.5mg once daily for 3 days, then 0.5mg twice daily for 3 days, then 1mg twice daily. 60 tablet 2     No current facility-administered medications for this visit.           Objective:    Vitals:    06/04/21 0849   BP: 128/80   Pulse: (!) 102   SpO2: 97%   Weight: 174 lb (78.9 kg)      Body mass index is 24.61 kg/m .    Alert.  Cooperative.  No tremor.  No significant psychomotor agitation.  Appears forthcoming.  Blood pressure 130/88 on recheck.  Pulse initially 100 then increasing to likely 120 with discussion as above.  Umbilical hernia, reducible without abdominal distention.      This note has been dictated using voice recognition software and as a result may contain minor grammatical errors and unintended word substitutions.

## 2021-06-27 ENCOUNTER — HEALTH MAINTENANCE LETTER (OUTPATIENT)
Age: 41
End: 2021-06-27

## 2021-07-03 NOTE — ADDENDUM NOTE
Addendum Note by Jennifer Garcia MD at 7/10/2020  2:00 PM     Author: Jennifer Garcia MD Service: -- Author Type: Physician    Filed: 7/12/2020  5:26 PM Encounter Date: 7/10/2020 Status: Signed    : Jennifer Garcia MD (Physician)    Addended by: JENNIFER GARCIA on: 7/12/2020 05:26 PM        Modules accepted: Orders

## 2021-07-03 NOTE — ADDENDUM NOTE
Addendum Note by Jennifer Garcia MD at 1/21/2021  9:30 AM     Author: Jennifer Garcia MD Service: -- Author Type: Physician    Filed: 1/21/2021  6:45 PM Encounter Date: 1/21/2021 Status: Signed    : Jennifer Garcia MD (Physician)    Addended by: JENNIFER GARCIA on: 1/21/2021 06:45 PM        Modules accepted: Orders

## 2021-07-06 VITALS
SYSTOLIC BLOOD PRESSURE: 128 MMHG | WEIGHT: 174 LBS | BODY MASS INDEX: 24.61 KG/M2 | HEART RATE: 102 BPM | DIASTOLIC BLOOD PRESSURE: 80 MMHG | OXYGEN SATURATION: 97 %

## 2021-07-19 DIAGNOSIS — I10 ESSENTIAL HYPERTENSION, BENIGN: ICD-10-CM

## 2021-07-24 RX ORDER — LOSARTAN POTASSIUM AND HYDROCHLOROTHIAZIDE 25; 100 MG/1; MG/1
1 TABLET ORAL DAILY
Qty: 90 TABLET | Refills: 2 | Status: SHIPPED | OUTPATIENT
Start: 2021-07-24 | End: 2022-03-08

## 2021-07-24 NOTE — TELEPHONE ENCOUNTER
"Last Written Prescription Date:  1/21/21  Last Fill Quantity: 90,  # refills: 1   Last office visit provider:  5/4/21     Requested Prescriptions   Pending Prescriptions Disp Refills     losartan-hydrochlorothiazide (HYZAAR) 100-25 MG tablet 90 tablet 1     Sig: Take 1 tablet by mouth daily       Angiotensin-II Receptors Passed - 7/19/2021  3:25 PM        Passed - Last blood pressure under 140/90 in past 12 months     BP Readings from Last 3 Encounters:   06/04/21 128/80   05/10/21 122/78   03/03/21 134/78                 Passed - Recent (12 mo) or future (30 days) visit within the authorizing provider's specialty     Patient has had an office visit with the authorizing provider or a provider within the authorizing providers department within the previous 12 mos or has a future within next 30 days. See \"Patient Info\" tab in inbasket, or \"Choose Columns\" in Meds & Orders section of the refill encounter.              Passed - Medication is active on med list        Passed - Patient is age 18 or older        Passed - Normal serum creatinine on file in past 12 months     Recent Labs   Lab Test 01/21/21  1012   CR 0.78       Ok to refill medication if creatinine is low          Passed - Normal serum potassium on file in past 12 months     Recent Labs   Lab Test 01/21/21  1012   POTASSIUM 4.3                   Diuretics (Including Combos) Protocol Passed - 7/19/2021  3:25 PM        Passed - Blood pressure under 140/90 in past 12 months     BP Readings from Last 3 Encounters:   06/04/21 128/80   05/10/21 122/78   03/03/21 134/78                 Passed - Recent (12 mo) or future (30 days) visit within the authorizing provider's specialty     Patient has had an office visit with the authorizing provider or a provider within the authorizing providers department within the previous 12 mos or has a future within next 30 days. See \"Patient Info\" tab in inbasket, or \"Choose Columns\" in Meds & Orders section of the refill " encounter.              Passed - Medication is active on med list        Passed - Patient is age 18 or older        Passed - Normal serum creatinine on file in past 12 months     Recent Labs   Lab Test 01/21/21  1012   CR 0.78              Passed - Normal serum potassium on file in past 12 months     Recent Labs   Lab Test 01/21/21  1012   POTASSIUM 4.3                    Passed - Normal serum sodium on file in past 12 months     Recent Labs   Lab Test 01/21/21  1012                      jovany henson RN 07/24/21 10:43 AM

## 2021-09-10 ENCOUNTER — OFFICE VISIT (OUTPATIENT)
Dept: FAMILY MEDICINE | Facility: CLINIC | Age: 41
End: 2021-09-10
Payer: COMMERCIAL

## 2021-09-10 VITALS
BODY MASS INDEX: 25.04 KG/M2 | DIASTOLIC BLOOD PRESSURE: 78 MMHG | HEART RATE: 88 BPM | SYSTOLIC BLOOD PRESSURE: 120 MMHG | OXYGEN SATURATION: 98 % | WEIGHT: 177 LBS

## 2021-09-10 DIAGNOSIS — R00.0 TACHYCARDIA: ICD-10-CM

## 2021-09-10 DIAGNOSIS — I10 ESSENTIAL HYPERTENSION, BENIGN: Primary | ICD-10-CM

## 2021-09-10 DIAGNOSIS — F10.10 ALCOHOL ABUSE: ICD-10-CM

## 2021-09-10 DIAGNOSIS — R79.89 ABNORMAL LFTS: ICD-10-CM

## 2021-09-10 PROCEDURE — 99214 OFFICE O/P EST MOD 30 MIN: CPT | Performed by: FAMILY MEDICINE

## 2021-09-10 NOTE — PROGRESS NOTES
Assessment/Plan:    Essential hypertension, benign  Hypertension improved control blood pressure 120/78 on recheck and will continue losartan hydrochlorothiazide 100/25 daily metoprolol succinate 100 mg daily which had been increased at office visit June 4, 2021 from 50 mg dose previously.  Reassess at physical exam within next 12 months.    Tachycardia  Tachycardia improved with pulse 88 on recheck while on metoprolol succinate 100 mg daily.    Alcohol abuse  Discussed at length alcohol abuse history.  Still has 3-4 beer per night plus a couple bloody Fabienne's that include 5 or 6 shots of alcohol likely.  Patient declines outpatient treatment.  Naltrexone made him feel poorly and declines further intervention at this time and wants to do it on his own.  Will reassess at scheduled follow-up.    Abnormal LFTs  Patient declines repeat LFTs at this time.  Declines hepatitis A vaccine series etc.  Did review lab testing from [facility] without evidence for hemochromatosis etc.  We did recommend ultrasound to further evaluate liver with consideration for future biopsy.  Patient did have a $1000 lab bill from [facility] for a 30-minute appointment and is hesitant to pursue significant additional evaluation through gastroenterology office ideally.  Did review likely correlation to excess alcohol consumption with future associated risks noted.  - US Abd Hepatic & Portal Vasculature Cmpl          Subjective:    Agus Barragan is seen today for follow-up assessment.  Had been seen June 4, 2021 underlying hypertension suboptimal control.  Told to continue losartan hydrochlorothiazide 100/25 while increasing metoprolol succinate from 50 mg up to 100 mg daily.  Had associated tachycardia at that time as well.  Felt secondary to alcohol abuse concerns with ongoing use described as 3-4 beer per night as well as a couple bloody Fabienne's that include 5-6 shots of alcohol.  This has not changed since prior visit.  Not interested in  inpatient or outpatient therapy or counseling services or AA assessment etc.  Patient declines vaccinations.  States naltrexone caused him to feel poorly for a couple days until discontinued.  Did have assessment through [facility] for LFT elevation as well with prior ferritin level elevation.  Hemochromatosis testing appeared unremarkable etc.  Patient was to have ultrasound however never scheduled stating that 30-minute visit cost of approximately $1000 through GI office.  Comprehensive review of systems as above otherwise all negative.     - Areli   1 daughter - Kathy (8)   Tobacco: 1/2 ppd (prior 1 ppd) - now vape   EtOH: 5-6 beer/drinks   Mom - mom's side with HTN   Dad -   1 younger bro - cleft palate   Surgeries: none   Hospitalizations: none   Work: [employer] ( for disability benefits)   Hobbies:    No past surgical history on file.     No family history on file.     No past medical history on file.     Social History     Tobacco Use     Smoking status: Current Every Day Smoker     Smokeless tobacco: Never Used   Substance Use Topics     Alcohol use: Not on file     Drug use: Not on file        Current Outpatient Medications   Medication Sig Dispense Refill     losartan-hydrochlorothiazide (HYZAAR) 100-25 MG tablet Take 1 tablet by mouth daily 90 tablet 2     metoprolol succinate (TOPROL XL) 100 MG 24 hr tablet [METOPROLOL SUCCINATE (TOPROL XL) 100 MG 24 HR TABLET] Take 1 tablet (100 mg total) by mouth daily. 90 tablet 3          Objective:    Vitals:    09/10/21 1155 09/10/21 1156   BP: 120/80 120/78   Pulse: 99 88   SpO2: 98%    Weight: 80.3 kg (177 lb)       Body mass index is 25.04 kg/m .    Alert.  No apparent distress.  Quiet affect.  Cooperative and forthcoming.  Pleasant.  Blood pressure on recheck was 120/78 with pulse 88 and regular.  Extremities warm and dry.      This note has been dictated using voice recognition software and as a result may contain minor grammatical  errors and unintended word substitutions.

## 2021-09-24 ENCOUNTER — HOSPITAL ENCOUNTER (OUTPATIENT)
Dept: ULTRASOUND IMAGING | Facility: HOSPITAL | Age: 41
Discharge: HOME | End: 2021-09-24
Attending: FAMILY MEDICINE | Admitting: FAMILY MEDICINE
Payer: COMMERCIAL

## 2021-09-24 DIAGNOSIS — R79.89 ABNORMAL LFTS: ICD-10-CM

## 2021-09-24 PROCEDURE — 93979 VASCULAR STUDY: CPT

## 2021-10-17 ENCOUNTER — HEALTH MAINTENANCE LETTER (OUTPATIENT)
Age: 41
End: 2021-10-17

## 2022-03-08 DIAGNOSIS — I10 ESSENTIAL HYPERTENSION, BENIGN: ICD-10-CM

## 2022-03-08 RX ORDER — LOSARTAN POTASSIUM AND HYDROCHLOROTHIAZIDE 25; 100 MG/1; MG/1
1 TABLET ORAL DAILY
Qty: 90 TABLET | Refills: 1 | Status: SHIPPED | OUTPATIENT
Start: 2022-03-08 | End: 2022-06-09

## 2022-04-02 ENCOUNTER — HEALTH MAINTENANCE LETTER (OUTPATIENT)
Age: 42
End: 2022-04-02

## 2022-05-03 DIAGNOSIS — I10 ESSENTIAL HYPERTENSION, BENIGN: ICD-10-CM

## 2022-05-03 DIAGNOSIS — R00.0 TACHYCARDIA: ICD-10-CM

## 2022-05-06 RX ORDER — METOPROLOL SUCCINATE 100 MG/1
100 TABLET, EXTENDED RELEASE ORAL DAILY
Qty: 90 TABLET | Refills: 1 | Status: SHIPPED | OUTPATIENT
Start: 2022-05-06

## 2022-05-06 NOTE — TELEPHONE ENCOUNTER
"Last Written Prescription Date:  6/4/21  Last Fill Quantity: 90,  # refills: 3   Last office visit provider:  9/10/21     Requested Prescriptions   Pending Prescriptions Disp Refills     metoprolol succinate ER (TOPROL XL) 100 MG 24 hr tablet [Pharmacy Med Name: Metoprolol Succinate  MG Oral Tablet Extended Release 24 Hour] 90 tablet 3     Sig: TAKE 1 TABLET BY MOUTH  DAILY       Beta-Blockers Protocol Passed - 5/6/2022 10:00 AM        Passed - Blood pressure under 140/90 in past 12 months     BP Readings from Last 3 Encounters:   09/10/21 120/78   06/04/21 128/80   05/10/21 122/78                 Passed - Patient is age 6 or older        Passed - Recent (12 mo) or future (30 days) visit within the authorizing provider's specialty     Patient has had an office visit with the authorizing provider or a provider within the authorizing providers department within the previous 12 mos or has a future within next 30 days. See \"Patient Info\" tab in inbasket, or \"Choose Columns\" in Meds & Orders section of the refill encounter.              Passed - Medication is active on med list             Mario Aguayo RN 05/06/22 10:00 AM    "

## 2022-08-23 ENCOUNTER — OFFICE VISIT (OUTPATIENT)
Dept: FAMILY MEDICINE | Facility: CLINIC | Age: 42
End: 2022-08-23
Payer: COMMERCIAL

## 2022-08-23 VITALS
WEIGHT: 177 LBS | HEIGHT: 71 IN | BODY MASS INDEX: 24.78 KG/M2 | SYSTOLIC BLOOD PRESSURE: 128 MMHG | HEART RATE: 96 BPM | DIASTOLIC BLOOD PRESSURE: 80 MMHG | OXYGEN SATURATION: 98 %

## 2022-08-23 DIAGNOSIS — R53.83 DECREASED STAMINA: ICD-10-CM

## 2022-08-23 DIAGNOSIS — R79.89 ELEVATED FERRITIN: ICD-10-CM

## 2022-08-23 DIAGNOSIS — F10.931 DELIRIUM TREMENS (H): ICD-10-CM

## 2022-08-23 DIAGNOSIS — I10 ESSENTIAL HYPERTENSION, BENIGN: ICD-10-CM

## 2022-08-23 DIAGNOSIS — F17.290 OTHER TOBACCO PRODUCT NICOTINE DEPENDENCE, UNCOMPLICATED: ICD-10-CM

## 2022-08-23 DIAGNOSIS — Z23 ENCOUNTER FOR IMMUNIZATION: ICD-10-CM

## 2022-08-23 DIAGNOSIS — F10.10 ALCOHOL ABUSE: ICD-10-CM

## 2022-08-23 DIAGNOSIS — R79.89 ABNORMAL LFTS: ICD-10-CM

## 2022-08-23 DIAGNOSIS — D72.819 LEUKOPENIA, UNSPECIFIED TYPE: ICD-10-CM

## 2022-08-23 DIAGNOSIS — E78.00 HYPERCHOLESTEROLEMIA: ICD-10-CM

## 2022-08-23 DIAGNOSIS — Z00.00 ROUTINE PHYSICAL EXAMINATION: Primary | ICD-10-CM

## 2022-08-23 LAB
ALBUMIN SERPL BCG-MCNC: 4.8 G/DL (ref 3.5–5.2)
ALP SERPL-CCNC: 50 U/L (ref 40–129)
ALT SERPL W P-5'-P-CCNC: 102 U/L (ref 10–50)
ANION GAP SERPL CALCULATED.3IONS-SCNC: 20 MMOL/L (ref 7–15)
AST SERPL W P-5'-P-CCNC: 99 U/L (ref 10–50)
BASOPHILS # BLD AUTO: 0 10E3/UL (ref 0–0.2)
BASOPHILS NFR BLD AUTO: 1 %
BILIRUB SERPL-MCNC: 0.8 MG/DL
BUN SERPL-MCNC: 12.4 MG/DL (ref 6–20)
CALCIUM SERPL-MCNC: 10.2 MG/DL (ref 8.6–10)
CHLORIDE SERPL-SCNC: 84 MMOL/L (ref 98–107)
CREAT SERPL-MCNC: 3.21 MG/DL (ref 0.67–1.17)
DEPRECATED HCO3 PLAS-SCNC: 25 MMOL/L (ref 22–29)
EOSINOPHIL # BLD AUTO: 0.1 10E3/UL (ref 0–0.7)
EOSINOPHIL NFR BLD AUTO: 1 %
ERYTHROCYTE [DISTWIDTH] IN BLOOD BY AUTOMATED COUNT: 14.1 % (ref 10–15)
FERRITIN SERPL-MCNC: 1157 NG/ML (ref 31–409)
GFR SERPL CREATININE-BSD FRML MDRD: 24 ML/MIN/1.73M2
GGT SERPL-CCNC: 332 U/L (ref 8–61)
GLUCOSE SERPL-MCNC: 97 MG/DL (ref 70–99)
HCT VFR BLD AUTO: 31.1 % (ref 40–53)
HGB BLD-MCNC: 10.9 G/DL (ref 13.3–17.7)
IMM GRANULOCYTES # BLD: 0 10E3/UL
IMM GRANULOCYTES NFR BLD: 0 %
LIPASE SERPL-CCNC: 88 U/L (ref 13–60)
LYMPHOCYTES # BLD AUTO: 0.6 10E3/UL (ref 0.8–5.3)
LYMPHOCYTES NFR BLD AUTO: 15 %
MCH RBC QN AUTO: 35.2 PG (ref 26.5–33)
MCHC RBC AUTO-ENTMCNC: 35 G/DL (ref 31.5–36.5)
MCV RBC AUTO: 100 FL (ref 78–100)
MONOCYTES # BLD AUTO: 0.6 10E3/UL (ref 0–1.3)
MONOCYTES NFR BLD AUTO: 13 %
NEUTROPHILS # BLD AUTO: 3 10E3/UL (ref 1.6–8.3)
NEUTROPHILS NFR BLD AUTO: 70 %
PLATELET # BLD AUTO: 106 10E3/UL (ref 150–450)
POTASSIUM SERPL-SCNC: 3.8 MMOL/L (ref 3.4–5.3)
PROT SERPL-MCNC: 7.6 G/DL (ref 6.4–8.3)
RBC # BLD AUTO: 3.1 10E6/UL (ref 4.4–5.9)
SODIUM SERPL-SCNC: 129 MMOL/L (ref 136–145)
WBC # BLD AUTO: 4.3 10E3/UL (ref 4–11)

## 2022-08-23 PROCEDURE — 36415 COLL VENOUS BLD VENIPUNCTURE: CPT | Performed by: FAMILY MEDICINE

## 2022-08-23 PROCEDURE — 99214 OFFICE O/P EST MOD 30 MIN: CPT | Mod: 25 | Performed by: FAMILY MEDICINE

## 2022-08-23 PROCEDURE — 83690 ASSAY OF LIPASE: CPT | Performed by: FAMILY MEDICINE

## 2022-08-23 PROCEDURE — 82977 ASSAY OF GGT: CPT | Performed by: FAMILY MEDICINE

## 2022-08-23 PROCEDURE — 85025 COMPLETE CBC W/AUTO DIFF WBC: CPT | Performed by: FAMILY MEDICINE

## 2022-08-23 PROCEDURE — 90471 IMMUNIZATION ADMIN: CPT | Performed by: FAMILY MEDICINE

## 2022-08-23 PROCEDURE — 90677 PCV20 VACCINE IM: CPT | Performed by: FAMILY MEDICINE

## 2022-08-23 PROCEDURE — 99396 PREV VISIT EST AGE 40-64: CPT | Mod: 25 | Performed by: FAMILY MEDICINE

## 2022-08-23 PROCEDURE — 82728 ASSAY OF FERRITIN: CPT | Performed by: FAMILY MEDICINE

## 2022-08-23 PROCEDURE — 80053 COMPREHEN METABOLIC PANEL: CPT | Performed by: FAMILY MEDICINE

## 2022-08-23 ASSESSMENT — ENCOUNTER SYMPTOMS
EYE PAIN: 0
HEMATOCHEZIA: 0
FREQUENCY: 0
DIARRHEA: 0
JOINT SWELLING: 0
ARTHRALGIAS: 0
PALPITATIONS: 0
SHORTNESS OF BREATH: 0
DYSURIA: 0
ABDOMINAL PAIN: 0
HEARTBURN: 0
NERVOUS/ANXIOUS: 0
CONSTIPATION: 0
MYALGIAS: 0
SORE THROAT: 0
DIZZINESS: 1
NAUSEA: 1
FEVER: 0
PARESTHESIAS: 0
COUGH: 0
HEMATURIA: 0
CHILLS: 0
HEADACHES: 0

## 2022-08-23 ASSESSMENT — PAIN SCALES - GENERAL: PAINLEVEL: NO PAIN (0)

## 2022-08-23 NOTE — PROGRESS NOTES
Assessment/Plan:     Routine physical examination  Routine healthcare maintenance.  Preventative cares reviewed.  Annual physical exams to continue.  Patient likely relocating to [facility] and will establish care once settled.    Alcohol abuse  Alcohol abuse ongoing and does likely drink between 8 and 10 drinks daily perhaps more on weekends.  Did measure total alcohol consumption.  Patient concerned about abstinence due to potential for alcohol withdrawal with history of delirium tremens May 2020 described with auditory and visual hallucinations at that time.  We will update lab assessment today and continue attempts at moderation of alcohol until establishing care in [facility] for recommended CD treatment.  - Lipase    Leukopenia, unspecified type  Prior leukopenia noted.  CBC on recheck today with white count 4.3.  Did discuss direct toxic effects of alcohol on bone marrow and risk for pancytopenia findings.    Decreased stamina  CBC and comprehensive metabolic panels updated today.  - CBC with Platelets & Differential  - Comprehensive metabolic panel  - CBC with Platelets & Differential  - Comprehensive metabolic panel    Delirium tremens (H)  History of delirium tremens described historically around May 2020.  Moderation of alcohol consumption needed for future complete abstinence.    Essential hypertension, benign  Utilizing losartan hydrochlorothiazide 100/25 daily as well as metoprolol succinate 100 mg recently using 50 mg daily dose.  Will monitor blood pressures and avoid orthostatic hypotension.  Titrate to lowest effective dose.  Blood pressure 128/80 with pulse 96 while seated.  Blood pressure 136/90 with pulse 112 after standing 1 minute.    Hypercholesterolemia  Hypercholesterolemia historically.  Dietary and exercise modifications reviewed.    Other tobacco product nicotine dependence, uncomplicated  Vaping nicotine product currently.  Quit smoking cigarettes  "previously.    Encounter for immunization  Pneumococcal 20 vaccine provided.  - Pneumococcal 20 Valent Conjugate (Prevnar 20)    Abnormal LFTs  LFT elevation associated with alcohol abuse concerns.  Has had elevated ferritin levels historically as well and did complete abdominal ultrasound as noted below with evidence for hepatic steatosis present.  - Ferritin  - GGT  - Ferritin  - GGT    Elevated ferritin  Repeat ferritin level pending.  - Ferritin  - Ferritin             Subjective:     Agus Barragan is a 42 year old male who presents for an annual exam.  Patient does have poor stamina.  Does continue to drink too much alcohol.  Initially states perhaps 7 or 8 drinks per day but later admits to 8-10 with perhaps 11 drinks or more on weekends.  Wife drinks but not as much.  Worries about stopping alcohol which had happened back in May 2020 for about 3 days and had auditory and visual hallucinations etc.  Utilizes losartan hydrochlorothiazide 100/25 daily plus metoprolol succinate 100 mg daily.  Recently has cut back to 50 mg metoprolol dosing due to dizziness and lightheadedness sometimes with position change.  Vision can \"darken\".  Had noted blood pressure 91/66 and 89/62 therefore cut back metoprolol from 100 mg down to 50 mg daily.  Fatty liver on prior abdominal ultrasound.  Has had elevated ferritin levels associate with LFT elevation.  Ultrasound did not show evidence for hepatic steatosis.  Does have elevated cholesterol historically as well.  Comprehensive review of systems as above otherwise all negative.  Past medical social and family history reviewed and updated as noted below.       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)   Hobbies:  gardening (vegetable)      Healthy Habits:     Getting at least 3 servings of Calcium per day:  Yes    Bi-annual eye exam:  Yes    Dental care twice a year:  Yes    Sleep apnea or symptoms of sleep apnea:  None    Diet:  Regular (no restrictions)    Frequency of exercise:  None    Taking medications regularly:  Yes    Medication side effects:  Lightheadedness    PHQ-2 Total Score: 0    Additional concerns today:  No       Immunization History   Administered Date(s) Administered     MMR 04/27/1994     Pneumococcal 20 valent Conjugate (Prevnar 20) 08/23/2022     Immunization status: Pneumococcal immunization will be updated today.  Patient declines Adacel booster.  Declines COVID shots, flu shot etc.    Current Outpatient Medications   Medication Sig Dispense Refill     losartan-hydrochlorothiazide (HYZAAR) 100-25 MG tablet TAKE 1 TABLET BY MOUTH  DAILY 90 tablet 1     metoprolol succinate ER (TOPROL XL) 100 MG 24 hr tablet Take 1 tablet (100 mg) by mouth daily 90 tablet 1     No past medical history on file.  No past surgical history on file.  Patient has no known allergies.  No family history on file.  Social History     Socioeconomic History     Marital status:      Spouse name: Not on file     Number of children: Not on file     Years of education: Not on file     Highest education level: Not on file   Occupational History     Not on file   Tobacco Use     Smoking status: Current Every Day Smoker     Smokeless tobacco: Never Used   Substance and Sexual Activity     Alcohol use: Not on file     Drug use: Not on file     Sexual activity: Not on file   Other Topics Concern     Not on file   Social History Narrative     Not on file     Social Determinants of Health     Financial Resource Strain: Not on file   Food Insecurity: Not on file   Transportation Needs: Not on file   Physical Activity: Not on file   Stress: Not on file   Social Connections: Not on file  "  Intimate Partner Violence: Not on file   Housing Stability: Not on file       Review of Systems  Comprehensive ROS: as above, otherwise all negative.           Objective:     /80   Pulse 96   Ht 1.791 m (5' 10.5\")   Wt 80.3 kg (177 lb)   SpO2 98%   BMI 25.04 kg/m    Body mass index is 25.04 kg/m .    Physical    General Appearance:    Alert, cooperative, no distress, appears stated age.  Mildly tremulous.  Admits to having a beer prior to his office visit today.   Head:    Normocephalic, without obvious abnormality, atraumatic   Eyes:    PERRL, conjunctiva/corneas clear, EOM's intact, fundi     benign, both eyes        Ears:    Normal TM's and external ear canals, both ears   Nose:   Nares normal, septum midline, mucosa normal, no drainage    or sinus tenderness   Throat:   Lips, mucosa, and tongue normal; teeth and gums normal   Neck:   Supple, symmetrical, trachea midline, no adenopathy;        thyroid:  No enlargement/tenderness/nodules; no carotid    bruit or JVD   Back:     Symmetric, no curvature, ROM normal, no CVA tenderness   Lungs:     Clear to auscultation bilaterally, respirations unlabored   Chest wall:    No tenderness or deformity   Heart:    Regular rate and rhythm, S1 and S2 normal, no murmur, rub   or gallop   Abdomen:     Soft, non-tender, bowel sounds active all four quadrants,     no masses, no organomegaly.  Umbilical hernia, small to moderate, reducible.   Genitalia:    Patient declines    Rectal:    deferred   Extremities:   Extremities normal, atraumatic, no cyanosis or edema   Pulses:   2+ and symmetric all extremities   Skin:   Skin color, texture, turgor normal, no rashes or lesions   Lymph nodes:   Cervical, supraclavicular, and axillary nodes normal   Neurologic:   CNII-XII intact. Normal strength, sensation and reflexes       throughout          EXAM: US ABD HEPATIC and PORTAL VASCULATURE CMPL  LOCATION: [facility]  DATE/TIME: 9/24/2021 10:18 " AM     INDICATION: Abnormal LFTs  COMPARISON: None.     TECHNIQUE: Real-time gray-scale sonographic imaging of the abdomen was performed including duplex spectral and color Doppler evaluation of the abdominal portal and systemic vasculature.     FINDINGS:     GALLBLADDER: Normal. No gallstones, wall thickening, or pericholecystic fluid. Negative sonographic Omalley's sign.      BILE DUCTS: No biliary dilatation. The common duct measures 5 mm.     LIVER: Increased echogenicity from diffuse fatty infiltration. No focal mass. Benign cysts measuring 1.4 x 1.1 x 1.6 cm in the right hepatic lobe.     KIDNEYS: No hydronephrosis or suspicious masses.     PANCREAS: The visualized portions are normal.     AORTA: Normal in caliber.     IVC: Normal where visualized.     No ascites.     ABDOMINAL DUPLEX: The hepatic artery, hepatic veins, IVC, portal veins, and splenic vein are patent with flow in the normal direction.      VELOCITIES:  Main Portal: 28 cm/s, Hepatopedal  Left Portal Vein: 26 cm/s, Hepatopedal  Right Portal Vein: 16 cm/s, Hepatopedal  Left Hepatic vein: 26 cm/s  Middle Hepatic vein: 11 cm/s  Right Hepatic vein: 20 cm/s  IVC: 47 cm/s  Hepatic Artery: 73 cm/s  Splenic Vein: 22 cm/s     Main portal vein measures 15 mm in maximum diameter.                                                                      IMPRESSION:  1.  Increased echogenicity throughout the liver suggesting fatty infiltration.  2.  Normal abdominal liver duplex.  3.  No ascites present.            This note has been dictated using voice recognition software and as a result may contain minor grammatical errors and unintended word substitutions.       Answers for HPI/ROS submitted by the patient on 8/23/2022  Frequency of exercise:: None  Getting at least 3 servings of Calcium per day:: Yes  Diet:: Regular (no restrictions)  Taking medications regularly:: Yes  Medication side effects:: Lightheadedness  Bi-annual eye exam:: Yes  Dental care twice a  year:: Yes  Sleep apnea or symptoms of sleep apnea:: None  abdominal pain: No  Blood in stool: No  Blood in urine: No  chest pain: No  chills: No  congestion: No  constipation: No  cough: No  diarrhea: No  dizziness: Yes  ear pain: No  eye pain: No  nervous/anxious: No  fever: No  frequency: No  genital sores: No  headaches: No  hearing loss: No  heartburn: No  arthralgias: No  joint swelling: No  peripheral edema: No  mood changes: No  myalgias: No  nausea: Yes  dysuria: No  palpitations: No  Skin sensation changes: No  sore throat: No  urgency: No  rash: No  shortness of breath: No  visual disturbance: No  impotence: Yes  penile discharge: Yes  Additional concerns today:: No

## 2022-08-24 ENCOUNTER — PATIENT OUTREACH (OUTPATIENT)
Dept: ONCOLOGY | Facility: CLINIC | Age: 42
End: 2022-08-24

## 2022-08-24 DIAGNOSIS — R79.89 ABNORMAL LFTS: ICD-10-CM

## 2022-08-24 DIAGNOSIS — R79.89 ELEVATED FERRITIN: Primary | ICD-10-CM

## 2022-08-24 NOTE — PROGRESS NOTES
Referral received for benign heme services, see below.    Referral reason: elevated ferritin    Current abnormal labs:   Lab Results   Component Value Date    WBC 4.3 08/23/2022     Lab Results   Component Value Date    RBC 3.10 08/23/2022     Lab Results   Component Value Date    HGB 10.9 08/23/2022     Lab Results   Component Value Date    HCT 31.1 08/23/2022     Lab Results   Component Value Date     08/23/2022     Lab Results   Component Value Date    MCH 35.2 08/23/2022     Lab Results   Component Value Date    MCHC 35.0 08/23/2022     Lab Results   Component Value Date    RDW 14.1 08/23/2022     Lab Results   Component Value Date     08/23/2022     ,   Ferritin   Date Value Ref Range Status   08/23/2022 1,157 (H) 31 - 409 ng/mL Final     Iron   Date Value Ref Range Status   07/10/2020 120 42 - 175 ug/dL Final        and   Last Comprehensive Metabolic Panel:  Sodium   Date Value Ref Range Status   08/23/2022 129 (L) 136 - 145 mmol/L Final     Potassium   Date Value Ref Range Status   08/23/2022 3.8 3.4 - 5.3 mmol/L Final   01/21/2021 4.3 3.5 - 5.0 mmol/L Final     Chloride   Date Value Ref Range Status   08/23/2022 84 (L) 98 - 107 mmol/L Final   01/21/2021 98 98 - 107 mmol/L Final     Carbon Dioxide (CO2)   Date Value Ref Range Status   08/23/2022 25 22 - 29 mmol/L Final   01/21/2021 27 22 - 31 mmol/L Final     Anion Gap   Date Value Ref Range Status   08/23/2022 20 (H) 7 - 15 mmol/L Final   01/21/2021 13 5 - 18 mmol/L Final     Glucose   Date Value Ref Range Status   08/23/2022 97 70 - 99 mg/dL Final   01/21/2021 92 70 - 125 mg/dL Final     Urea Nitrogen   Date Value Ref Range Status   08/23/2022 12.4 6.0 - 20.0 mg/dL Final   01/21/2021 8 8 - 22 mg/dL Final     Creatinine   Date Value Ref Range Status   08/23/2022 3.21 (H) 0.67 - 1.17 mg/dL Final     GFR Estimate   Date Value Ref Range Status   08/23/2022 24 (L) >60 mL/min/1.73m2 Final     Comment:     Effective December 21, 2021 eGFRcr in adults  is calculated using the 2021 CKD-EPI creatinine equation which includes age and gender (Jacoby et al., NEJ, DOI: 10.1056/RSSRib7184265)   01/21/2021 >60 >60 mL/min/1.73m2 Final     Calcium   Date Value Ref Range Status   08/23/2022 10.2 (H) 8.6 - 10.0 mg/dL Final     Bilirubin Total   Date Value Ref Range Status   08/23/2022 0.8 <=1.2 mg/dL Final     Alkaline Phosphatase   Date Value Ref Range Status   08/23/2022 50 40 - 129 U/L Final     ALT   Date Value Ref Range Status   08/23/2022 102 (H) 10 - 50 U/L Final     AST   Date Value Ref Range Status   08/23/2022 99 (H) 10 - 50 U/L Final       Outreach: Call not placed to patient regarding referral.    Plan: Internal Referral: No additional work-up needed, scheduling instructions updated, referral transferred to NPS for completion.

## 2022-10-01 ENCOUNTER — HEALTH MAINTENANCE LETTER (OUTPATIENT)
Age: 42
End: 2022-10-01

## 2023-10-21 ENCOUNTER — HEALTH MAINTENANCE LETTER (OUTPATIENT)
Age: 43
End: 2023-10-21

## 2024-12-08 ENCOUNTER — HEALTH MAINTENANCE LETTER (OUTPATIENT)
Age: 44
End: 2024-12-08

## 2025-06-02 NOTE — PROGRESS NOTES
Assessment/Plan:    1. Essential hypertension, benign  Hypertension improved control blood pressure 120/78 on recheck.  Ensure stable renal function and electrolytes.  Reassess at annual physical exam in 6 months.  - Comprehensive Metabolic Panel  - losartan-hydrochlorothiazide (HYZAAR) 50-12.5 mg per tablet; Take 1 tablet by mouth daily.  Dispense: 30 tablet; Refill: 5    2. Tobacco use disorder  Tobacco use disorder.  Continues quarter pack per day smoking habit having cut back from half pack per day.  Has not started Chantix.  Did recommend use of Chantix for smoking cessation.    3. Abnormal LFTs  LFT elevation likely secondary to alcohol abuse.  Check ferritin level today in addition to repeat LFTs otherwise recent ,  and .  Hepatitis screen negative previously.  Did recommend abstinence from alcohol and then reassessment of LFTs in next 4 to 6 weeks otherwise we will really check at physical exam in 6 months.  - Comprehensive Metabolic Panel  - Ferritin    4. Alcohol abuse  4-5 drinks per day.  Did recommend abstinence otherwise limited to no more than 2 ounces alcohol daily.    5. Tachycardia  Pulse rate 118 initially on presentation we will check TSH to ensure no evidence for hyperthyroidism.  - Thyroid Stimulating Hormone (TSH)        Subjective:    Agus Barragan is seen today for follow-up evaluation.  Hypertension previously outlined.  Had been seen May 15 with subsequent reassessment June 9, 2020 after starting losartan 50 mg daily.  And switch to losartan hydrochlorothiazide 50/12.5 daily and doing well with this without side effects.  Has had cholesterol elevation of 292 with HDL 97 and .  Continues to smoke however did cut back to 1/4 pack/day.  Has Chantix prescribed however has not started.  LFT elevation historically with alcohol abuse concerns.  Describes family members is high functioning alcohol consumers.  Prior CBC normal.  Hep screen negative.  Prior  with   and ALT were 316.  Comprehensive review of systems as above otherwise all negative.     - Areli  1 daughter - Kathy (7)  Tobacco: 1/2 ppd (prior 1 ppd) - prior vape  EtOH: 5-6 beer/drinks on weekend  Mom - mom's side with HTN  Dad -   1 younger bro - cleft palate  Surgeries: none  Hospitalizations: none  Work: [employer] ( for disability benefits)  Hobbies:    History reviewed. No pertinent surgical history.     History reviewed. No pertinent family history.     History reviewed. No pertinent past medical history.     Social History     Tobacco Use     Smoking status: Current Every Day Smoker     Smokeless tobacco: Never Used   Substance Use Topics     Alcohol use: Not on file     Drug use: Not on file        Current Outpatient Medications   Medication Sig Dispense Refill     losartan-hydrochlorothiazide (HYZAAR) 50-12.5 mg per tablet Take 1 tablet by mouth daily. 30 tablet 5     varenicline (CHANTIX RASHMI) 0.5 mg (11)- 1 mg (42) tablet Take  0.5mg once daily for 3 days, then 0.5mg twice daily for 3 days, then 1mg twice daily. 60 tablet 2     No current facility-administered medications for this visit.           Objective:    Vitals:    07/10/20 1356   BP: 120/84   Pulse: (!) 118   SpO2: 98%   Weight: 166 lb (75.3 kg)      There is no height or weight on file to calculate BMI.    Alert.  No apparent distress.  Blood pressure 120/78 on recheck.  Tachycardia with pulse just over 100.  Cardiac exam otherwise regular.  Extremities are warm and dry.      This note has been dictated using voice recognition software and as a result may contain minor grammatical errors and unintended word substitutions.    verbal instruction/patient instructed